# Patient Record
Sex: MALE | URBAN - METROPOLITAN AREA
[De-identification: names, ages, dates, MRNs, and addresses within clinical notes are randomized per-mention and may not be internally consistent; named-entity substitution may affect disease eponyms.]

---

## 2023-07-31 ENCOUNTER — APPOINTMENT (EMERGENCY)
Dept: RADIOLOGY | Facility: HOSPITAL | Age: 63
End: 2023-07-31
Payer: COMMERCIAL

## 2023-07-31 ENCOUNTER — HOSPITAL ENCOUNTER (EMERGENCY)
Facility: HOSPITAL | Age: 63
Discharge: LEFT BEFORE DISCHARGE | End: 2023-07-31
Attending: EMERGENCY MEDICINE
Payer: COMMERCIAL

## 2023-07-31 VITALS
TEMPERATURE: 97.4 F | SYSTOLIC BLOOD PRESSURE: 150 MMHG | DIASTOLIC BLOOD PRESSURE: 87 MMHG | HEART RATE: 95 BPM | RESPIRATION RATE: 18 BRPM | OXYGEN SATURATION: 100 %

## 2023-07-31 DIAGNOSIS — T14.8XXA ABRASION: ICD-10-CM
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
## 2023-07-31 DIAGNOSIS — R41.82 ALTERED MENTAL STATUS: ICD-10-CM

## 2023-07-31 DIAGNOSIS — E16.2 HYPOGLYCEMIA: Primary | ICD-10-CM

## 2023-07-31 LAB
ALBUMIN SERPL BCP-MCNC: 3.8 G/DL (ref 3.5–5)
ALP SERPL-CCNC: 52 U/L (ref 34–104)
ALT SERPL W P-5'-P-CCNC: 50 U/L (ref 7–52)
AMMONIA PLAS-SCNC: <10 UMOL/L (ref 18–72)
ANION GAP SERPL CALCULATED.3IONS-SCNC: 7 MMOL/L
APAP SERPL-MCNC: <10 UG/ML (ref 10–20)
AST SERPL W P-5'-P-CCNC: 152 U/L (ref 13–39)
ATRIAL RATE: 98 BPM
BACTERIA UR QL AUTO: ABNORMAL /HPF
BASE EX.OXY STD BLDV CALC-SCNC: 47 % (ref 60–80)
BASE EXCESS BLDV CALC-SCNC: 0.3 MMOL/L
BASOPHILS # BLD AUTO: 0.04 THOUSANDS/ÂΜL (ref 0–0.1)
BASOPHILS NFR BLD AUTO: 0 % (ref 0–1)
BILIRUB SERPL-MCNC: 0.49 MG/DL (ref 0.2–1)
BILIRUB UR QL STRIP: NEGATIVE
BUN SERPL-MCNC: 23 MG/DL (ref 5–25)
CALCIUM SERPL-MCNC: 8.8 MG/DL (ref 8.4–10.2)
CHLORIDE SERPL-SCNC: 105 MMOL/L (ref 96–108)
CLARITY UR: CLEAR
CO2 SERPL-SCNC: 27 MMOL/L (ref 21–32)
COLOR UR: YELLOW
CREAT SERPL-MCNC: 1.26 MG/DL (ref 0.6–1.3)
EOSINOPHIL # BLD AUTO: 0.01 THOUSAND/ÂΜL (ref 0–0.61)
EOSINOPHIL NFR BLD AUTO: 0 % (ref 0–6)
ERYTHROCYTE [DISTWIDTH] IN BLOOD BY AUTOMATED COUNT: 13.4 % (ref 11.6–15.1)
ETHANOL SERPL-MCNC: <10 MG/DL
FLUAV RNA RESP QL NAA+PROBE: NEGATIVE
FLUBV RNA RESP QL NAA+PROBE: NEGATIVE
GFR SERPL CREATININE-BSD FRML MDRD: 60 ML/MIN/1.73SQ M
GLUCOSE SERPL-MCNC: 28 MG/DL (ref 65–140)
GLUCOSE SERPL-MCNC: 62 MG/DL (ref 65–140)
GLUCOSE SERPL-MCNC: 90 MG/DL (ref 65–140)
GLUCOSE UR STRIP-MCNC: NEGATIVE MG/DL
HCO3 BLDV-SCNC: 26.4 MMOL/L (ref 24–30)
HCT VFR BLD AUTO: 46 % (ref 36.5–49.3)
HGB BLD-MCNC: 15.3 G/DL (ref 12–17)
HGB UR QL STRIP.AUTO: ABNORMAL
HYALINE CASTS #/AREA URNS LPF: ABNORMAL /LPF
IMM GRANULOCYTES # BLD AUTO: 0.05 THOUSAND/UL (ref 0–0.2)
IMM GRANULOCYTES NFR BLD AUTO: 0 % (ref 0–2)
KETONES UR STRIP-MCNC: NEGATIVE MG/DL
LEUKOCYTE ESTERASE UR QL STRIP: ABNORMAL
LYMPHOCYTES # BLD AUTO: 1.32 THOUSANDS/ÂΜL (ref 0.6–4.47)
LYMPHOCYTES NFR BLD AUTO: 11 % (ref 14–44)
MCH RBC QN AUTO: 29.3 PG (ref 26.8–34.3)
MCHC RBC AUTO-ENTMCNC: 33.3 G/DL (ref 31.4–37.4)
MCV RBC AUTO: 88 FL (ref 82–98)
MONOCYTES # BLD AUTO: 1.13 THOUSAND/ÂΜL (ref 0.17–1.22)
MONOCYTES NFR BLD AUTO: 10 % (ref 4–12)
NEUTROPHILS # BLD AUTO: 9.26 THOUSANDS/ÂΜL (ref 1.85–7.62)
NEUTS SEG NFR BLD AUTO: 79 % (ref 43–75)
NITRITE UR QL STRIP: NEGATIVE
NON-SQ EPI CELLS URNS QL MICRO: ABNORMAL /HPF
NRBC BLD AUTO-RTO: 0 /100 WBCS
O2 CT BLDV-SCNC: 9.9 ML/DL
P AXIS: 64 DEGREES
PCO2 BLDV: 48 MM HG (ref 42–50)
PH BLDV: 7.36 [PH] (ref 7.3–7.4)
PH UR STRIP.AUTO: 5.5 [PH]
PLATELET # BLD AUTO: 205 THOUSANDS/UL (ref 149–390)
PMV BLD AUTO: 9.6 FL (ref 8.9–12.7)
PO2 BLDV: 26.2 MM HG (ref 35–45)
POTASSIUM SERPL-SCNC: 3.3 MMOL/L (ref 3.5–5.3)
PR INTERVAL: 220 MS
PROCALCITONIN SERPL-MCNC: 0.08 NG/ML
PROT SERPL-MCNC: 5.8 G/DL (ref 6.4–8.4)
PROT UR STRIP-MCNC: ABNORMAL MG/DL
QRS AXIS: 34 DEGREES
QRSD INTERVAL: 96 MS
QT INTERVAL: 382 MS
QTC INTERVAL: 487 MS
RBC # BLD AUTO: 5.23 MILLION/UL (ref 3.88–5.62)
RBC #/AREA URNS AUTO: ABNORMAL /HPF
RSV RNA RESP QL NAA+PROBE: NEGATIVE
SALICYLATES SERPL-MCNC: <5 MG/DL (ref 3–20)
SARS-COV-2 RNA RESP QL NAA+PROBE: NEGATIVE
SODIUM SERPL-SCNC: 139 MMOL/L (ref 135–147)
SP GR UR STRIP.AUTO: 1.02 (ref 1–1.03)
T WAVE AXIS: 29 DEGREES
TSH SERPL DL<=0.05 MIU/L-ACNC: 2.47 UIU/ML (ref 0.45–4.5)
UROBILINOGEN UR QL STRIP.AUTO: 0.2 E.U./DL
VENTRICULAR RATE: 98 BPM
WBC # BLD AUTO: 11.81 THOUSAND/UL (ref 4.31–10.16)
WBC #/AREA URNS AUTO: ABNORMAL /HPF

## 2023-07-31 PROCEDURE — 80179 DRUG ASSAY SALICYLATE: CPT | Performed by: EMERGENCY MEDICINE

## 2023-07-31 PROCEDURE — 96374 THER/PROPH/DIAG INJ IV PUSH: CPT

## 2023-07-31 PROCEDURE — 93010 ELECTROCARDIOGRAM REPORT: CPT | Performed by: INTERNAL MEDICINE

## 2023-07-31 PROCEDURE — 82805 BLOOD GASES W/O2 SATURATION: CPT | Performed by: EMERGENCY MEDICINE

## 2023-07-31 PROCEDURE — 84145 PROCALCITONIN (PCT): CPT | Performed by: EMERGENCY MEDICINE

## 2023-07-31 PROCEDURE — 82948 REAGENT STRIP/BLOOD GLUCOSE: CPT

## 2023-07-31 PROCEDURE — 80053 COMPREHEN METABOLIC PANEL: CPT | Performed by: EMERGENCY MEDICINE

## 2023-07-31 PROCEDURE — 81001 URINALYSIS AUTO W/SCOPE: CPT | Performed by: EMERGENCY MEDICINE

## 2023-07-31 PROCEDURE — 84443 ASSAY THYROID STIM HORMONE: CPT | Performed by: EMERGENCY MEDICINE

## 2023-07-31 PROCEDURE — 80143 DRUG ASSAY ACETAMINOPHEN: CPT | Performed by: EMERGENCY MEDICINE

## 2023-07-31 PROCEDURE — 73080 X-RAY EXAM OF ELBOW: CPT

## 2023-07-31 PROCEDURE — 93005 ELECTROCARDIOGRAM TRACING: CPT

## 2023-07-31 PROCEDURE — 85025 COMPLETE CBC W/AUTO DIFF WBC: CPT | Performed by: EMERGENCY MEDICINE

## 2023-07-31 PROCEDURE — 70450 CT HEAD/BRAIN W/O DYE: CPT

## 2023-07-31 PROCEDURE — 96361 HYDRATE IV INFUSION ADD-ON: CPT

## 2023-07-31 PROCEDURE — 0241U HB NFCT DS VIR RESP RNA 4 TRGT: CPT | Performed by: EMERGENCY MEDICINE

## 2023-07-31 PROCEDURE — 36415 COLL VENOUS BLD VENIPUNCTURE: CPT | Performed by: EMERGENCY MEDICINE

## 2023-07-31 PROCEDURE — 82077 ASSAY SPEC XCP UR&BREATH IA: CPT | Performed by: EMERGENCY MEDICINE

## 2023-07-31 PROCEDURE — G1004 CDSM NDSC: HCPCS

## 2023-07-31 PROCEDURE — 82140 ASSAY OF AMMONIA: CPT | Performed by: EMERGENCY MEDICINE

## 2023-07-31 RX ORDER — DEXTROSE MONOHYDRATE 25 G/50ML
25 INJECTION, SOLUTION INTRAVENOUS ONCE
Status: COMPLETED | OUTPATIENT
Start: 2023-07-31 | End: 2023-07-31

## 2023-07-31 RX ORDER — OXYMETAZOLINE HYDROCHLORIDE 0.05 G/100ML
2 SPRAY NASAL ONCE
Status: COMPLETED | OUTPATIENT
Start: 2023-07-31 | End: 2023-07-31

## 2023-07-31 RX ORDER — DEXTROSE MONOHYDRATE 100 MG/ML
50 INJECTION, SOLUTION INTRAVENOUS CONTINUOUS
Status: DISCONTINUED | OUTPATIENT
Start: 2023-07-31 | End: 2023-07-31 | Stop reason: HOSPADM

## 2023-07-31 RX ADMIN — OXYMETAZOLINE HYDROCHLORIDE 2 SPRAY: 0.05 SPRAY NASAL at 02:42

## 2023-07-31 RX ADMIN — DEXTROSE 50 ML/HR: 10 SOLUTION INTRAVENOUS at 03:15

## 2023-07-31 RX ADMIN — DEXTROSE MONOHYDRATE 25 ML: 25 INJECTION, SOLUTION INTRAVENOUS at 01:39

## 2023-07-31 NOTE — ED NOTES
Patient transported to X-ray then to 730  Shelli Lincoln RN  07/31/23 [address], RN  07/31/23 0862

## 2023-07-31 NOTE — ED PROVIDER NOTES
History  Chief Complaint   Patient presents with   • Altered Mental Status     [facility] ,per squad pt fell this morning no loc didn't hit his head, pt is diabetic was given 1 glucose. BS is 28 at triage. Patient is a 59-year-old male who presents for altered mental status. Per EMS, [facility]. Patient was awake but appeared altered and was unable to get up on his own. Patient reported to EMS history of diabetes and they therefore gave him glucose. Patient denies any complaints at this time. Patient does report that he fell earlier but denies head strike or loss of consciousness. Patient reports that he was unable to get up but "would have gotten up eventually". Patient reports history of diabetes for which he takes metformin and insulin. Patient reports he takes regular insulin in the morning and long-acting insulin at night. Patient states he has not yet taken his long-acting insulin this evening. Patient reports metformin only as his oral diabetic medications. Patient denies any sulfonylureas. Patient does not appear to have been seen in this facility before or other care everywhere compatible facilities and therefore his medication list cannot be confirmed at this time. Patient denies any recent illness including fevers or chills. Patient denies any abdominal pain, nausea, vomiting, diarrhea. Patient denies any chest pain, shortness of breath, palpitations. Patient stating "I am fine" and requesting discharge home. Prior to Admission Medications   Prescriptions Last Dose Informant Patient Reported? Taking?   metFORMIN (GLUCOPHAGE) 500 mg tablet   Yes Yes   Sig: Take 500 mg by mouth 2 (two) times a day with meals      Facility-Administered Medications: None       No past medical history on file. No past surgical history on file.     No family history on file. I have reviewed and agree with the history as documented. No existing history information found. No existing history information found. Review of Systems   Psychiatric/Behavioral: Positive for confusion (per EMS). All other systems reviewed and are negative. Physical Exam  Physical Exam  Vitals reviewed. Constitutional:       General: He is not in acute distress. Appearance: He is well-developed. He is not toxic-appearing or diaphoretic. HENT:      Head: Normocephalic and atraumatic. Right Ear: External ear normal.      Left Ear: External ear normal.      Nose: Nose normal.      Mouth/Throat:      Pharynx: Oropharynx is clear. Eyes:      Extraocular Movements: Extraocular movements intact. Pupils: Pupils are equal, round, and reactive to light. Cardiovascular:      Rate and Rhythm: Normal rate and regular rhythm. Heart sounds: Normal heart sounds. Pulmonary:      Effort: Pulmonary effort is normal. No respiratory distress. Breath sounds: Normal breath sounds. No stridor. No wheezing. Abdominal:      General: There is no distension. Palpations: Abdomen is soft. There is no mass. Tenderness: There is no abdominal tenderness. There is no guarding. Musculoskeletal:         General: Normal range of motion. Cervical back: Normal range of motion and neck supple. Right lower leg: Edema (1+ pitting; symmetric) present. Left lower leg: Edema (1+ pitting; symmetric) present. Skin:     General: Skin is dry. Capillary Refill: Capillary refill takes less than 2 seconds. Coloration: Skin is not pale. Findings: No erythema or rash. Comments: Skin slightly cool  Abrasion to right elbow   Neurological:      General: No focal deficit present. Mental Status: He is alert and oriented to person, place, and time. Cranial Nerves: No cranial nerve deficit. Sensory: No sensory deficit. Motor: No weakness. Coordination: Coordination normal.      Comments: Speech slightly slow   Psychiatric:         Speech: Speech normal.         Behavior: Behavior is cooperative. Vital Signs  ED Triage Vitals [07/31/23 0129]   Temperature Pulse Respirations Blood Pressure SpO2   (!) 97.4 °F (36.3 °C) 96 15 159/81 97 %      Temp Source Heart Rate Source Patient Position - Orthostatic VS BP Location FiO2 (%)   Tympanic Monitor Lying Right arm --      Pain Score       --           Vitals:    07/31/23 0129   BP: 159/81   Pulse: 96   Patient Position - Orthostatic VS: Lying         Visual Acuity      ED Medications  Medications   dextrose 50 % IV solution 25 mL (25 mL Intravenous Given 7/31/23 0139)       Diagnostic Studies  Results Reviewed     Procedure Component Value Units Date/Time    Blood gas, venous [[id number]] Collected: 07/31/23 0147    Lab Status: In process Specimen: Blood from Arm, Left Updated: 07/31/23 0151    CBC and differential [[id number]] Collected: 07/31/23 0144    Lab Status: In process Specimen: Blood from Arm, Left Updated: 07/31/23 0151    Ethanol [[id number]] Collected: 07/31/23 0144    Lab Status: In process Specimen: Blood from Arm, Left Updated: 07/31/23 0151    Comprehensive metabolic panel [[id number]] Collected: 07/31/23 0144    Lab Status: In process Specimen: Blood from Arm, Left Updated: 85/83/02 3690    Salicylate level [[id number]] Collected: 07/31/23 0144    Lab Status: In process Specimen: Blood from Arm, Left Updated: 07/31/23 0151    Acetaminophen level-If concentration is detectable, please discuss with medical  on call. [[id number]] Collected: 07/31/23 0144    Lab Status: In process Specimen: Blood from Arm, Left Updated: 07/31/23 0151    Ammonia [[id number]] Collected: 07/31/23 0144    Lab Status: In process Specimen: Blood from Arm, Left Updated: 07/31/23 0150    TSH, 3rd generation with Free T4 reflex [[id number]] Collected: 07/31/23 0144    Lab Status:  In process Specimen: Blood from Arm, Left Updated: 07/31/23 0150    Fingerstick Glucose (POCT) [[id number]]  (Abnormal) Collected: 07/31/23 0128    Lab Status: Final result Updated: 07/31/23 0142     POC Glucose 28 mg/dl                  CT head wo contrast    (Results Pending)   XR elbow 3+ vw RIGHT    (Results Pending)              Procedures  Procedures         ED Course  ED Course as of 07/31/23 0151   Mon Jul 31, 2023 0142 POC Glucose(!!): 28  D50 ordered. Pt slow to respond but A&Ox3. SBIRT 20yo+    Flowsheet Row Most Recent Value   Initial Alcohol Screen: US AUDIT-C     1. How often do you have a drink containing alcohol? 0 Filed at: 07/31/2023 0131   2. How many drinks containing alcohol do you have on a typical day you are drinking? 0 Filed at: 07/31/2023 0131   3a. Male UNDER 65: How often do you have five or more drinks on one occasion? 0 Filed at: 07/31/2023 0131   Audit-C Score 0 Filed at: 07/31/2023 0131   RONNIE: How many times in the past year have you. .. Used an illegal drug or used a prescription medication for non-medical reasons? Never Filed at: 07/31/2023 0131                    Medical Decision Making  Pt is a 61yo M who presents with altered mental status. Exam pertinent for neuro intact patient aside from slightly slow speech. Differential diagnosis to include but not limited to hypoglycemia, hyperglycemia, tox, hypotension, arrhythmia, hyperammonemia, hypercapnia, thyroid dysfunction, intracranial abnormality. Fingerstick upon arrival showed glucose of 28. D50 given. Will obtain labs and CT head to rule out additional causes of altered mental status. Will obtain x-ray of right elbow due to abrasion and fall. Still awaiting lab and imaging results at time of sign out. Pt signed out to oncoming physician with plan for dispo pending results and re-evaluation. Amount and/or Complexity of Data Reviewed  Labs: ordered.  Decision-making details documented in ED Course. Radiology: ordered. Risk  Prescription drug management. Disposition  Final diagnoses:   Hypoglycemia   Altered mental status   Fall, initial encounter   Abrasion     Time reflects when diagnosis was documented in both MDM as applicable and the Disposition within this note     Time User Action Codes Description Comment    7/31/2023  1:51 AM Mirela Felling Add [E16.2] Hypoglycemia     7/31/2023  1:51 AM Mirela Felling Add [R41.82] Altered mental status     7/31/2023  1:51 AM Mirela Felling Add [W19. GICK] Fall, initial encounter     7/31/2023  1:51 AM Mirela Felling Add [T14. 8XXA] Abrasion       ED Disposition     None      Follow-up Information    None         Patient's Medications   Discharge Prescriptions    No medications on file       No discharge procedures on file.     PDMP Review     None          ED Provider  Electronically Signed by           Dean Adam MD  07/31/23 4665

## 2023-07-31 NOTE — DISCHARGE INSTRUCTIONS
Follow-up with PCP for further care. Contact info provided below if needed. Return to the ED with new or worsening symptoms.